# Patient Record
(demographics unavailable — no encounter records)

---

## 2019-11-06 NOTE — RAD
EXAM: Chest, 2 views.

 

HISTORY: Pneumonia.

 

COMPARISON: 10/30/2019

 

FINDINGS: 2 views of the chest are obtained. There has been slight 

interval increase in posterior left upper lobe pneumonia. There is no 

pleural effusion or pneumothorax. The heart is normal in size.

 

IMPRESSION: Suspected slight interval increase in left lower lobe 

pneumonia.  Continued follow-up is recommended to confirm resolution.

 

Electronically signed by: Shae Carlson MD (11/6/2019 4:51 PM) [place] [id number]

## 2019-11-15 NOTE — RAD
3 2 views of the abdomen 11/15/2019

 

INDICATION: Abdominal pain

 

COMPARISON STUDY: None

 

FINDINGS: The bowel gas pattern is nonspecific and nonobstructive. No 

gross pneumoperitoneum is identified, though exam is limited for the 

purpose. Surgical clips in the right upper quadrant suggest prior 

cholecystectomy. Surgical clip in the left abdomen of uncertain 

significance noted. Postsurgical changes in the noted in the pelvis. No 

acute osseous changes are noted.

 

IMPRESSION: No radiographic evidence of acute intra-abdominal abnormality

 

Electronically signed by: Hugo Sotelo MD (11/15/2019 2:26 PM) [id number]

## 2020-01-01 NOTE — RAD
PQRS Compliance Statement:

 

One or more of the following individualized dose reduction techniques were

utilized for this examination:  

1. Automated exposure control  

2. Adjustment of the mA and/or kV according to patient size  

3. Use of iterative reconstruction technique

 

CT abdomen/pelvis with contrast 1/1/2020 3:00 PM

 

INDICATION: Abdominal pain

 

COMPARISON: None available

 

TECHNIQUE: Multiple axial CT images of the abdomen and pelvis were 

obtained after the intravenous administration of 75 mL nonionic contrast. 

Coronal and sagittal reformats are provided.

 

FINDINGS:

 

There is subsegmental atelectasis at the right lung base. Heart size is 

within normal limits.

 

Liver, spleen, bilateral adrenal glands and pancreas are normal in 

appearance. Gallbladder surgically absent. No significant intrahepatic or 

extrahepatic biliary ductal dilatation. The abdominal aorta is normal in 

course and caliber. There are no pathologically enlarged lymph nodes in 

the abdomen and pelvis. There is no abdominal free fluid. There is no free

intraperitoneal air.

 

Kidneys enhance symmetrically. There is a 3 mm calculus in inferior pole 

the right kidney. No hydronephrosis. There is a 10 mm cyst in the 

interpolar left kidney. Urinary bladder is within normal limits given 

degree of distention. There is a cystic lesion which arises off the 

vaginal cuff and measures 2.3 x 2.4 cm.

 

Small large bowel are normal in caliber. No evidence for bowel obstruction

or inflammation. Appendix is not definitively visualized. No pericecal 

inflammatory changes are present. No suspicious osseous normality is 

identified.

 

IMPRESSION:

1. No acute abnormality is identified in abdomen and pelvis.

2. 3 mm nonobstructing calculus is identified in inferior pole the right 

kidney.

3. Cystic lesion along the vaginal cuff measures 2.3 x 2.4 cm. This 

appears more simple compared to prior examination from 3/30/2012 where 

similar finding is noted measuring approximately 1.7 x 1.8 cm. Further 

evaluation with pelvic ultrasound may be of benefit for further 

characterization. Correlate with surgical history.

 

Electronically signed by: Belkis Hendrix MD (1/1/2020 3:55 PM) 

[facility]

## 2020-01-01 NOTE — PHYS DOC
Past History


Past Medical History:  No Pertinent History


Past Surgical History:  Other


Additional Past Surgical Histo:  foot surgery


Smoking:  Non-smoker


Alcohol Use:  Rarely


Drug Use:  None





Adult General


Chief Complaint


Chief Complaint:  ABDOMINAL PAIN





HPI


HPI





Patient is a 50-year-old female who presents with complaint of mid abdominal 

pain that has been going on for the last 2 months but has gotten a lot worse 

over the last 2 days. Patient rates her pain currently at a 10 out of 10. She 

does indicate that she's had some nausea but no vomiting. She denies any 

diarrhea. Patient states that her last normal bowel movement was yesterday. She 

also denies any chest pain or shortness breath. She has had no fever. She denies

any urinary discomfort. Patient states that nothing is improving her pain.[]





Review of Systems


Review of Systems





Constitutional: Denies fever or chills []


Eyes: Denies change in visual acuity, redness, or eye pain []


HENT: Denies nasal congestion or sore throat []


Respiratory: Denies cough or shortness of breath []


Cardiovascular: No additional information not addressed in HPI []


GI: Denies abdominal pain, nausea, vomiting, bloody stools or diarrhea []


: Denies dysuria or hematuria []


Musculoskeletal: Denies back pain or joint pain []


Integument: Denies rash or skin lesions []


Neurologic: Denies headache, focal weakness or sensory changes []


Endocrine: Denies polyuria or polydipsia []





All other systems were reviewed and found to be within normal limits, except as 

documented in this note.





Current Medications


Current Medications





Current Medications








 Medications


  (Trade)  Dose


 Ordered  Sig/[facility]  Start Time


 Stop Time Status Last Admin


Dose Admin


 


 Hydromorphone HCl


  (Dilaudid)  0.5 mg  PRN Q15MIN  PRN  1/1/20 15:00


 1/2/20 14:59  1/1/20 15:18


0.5 MG


 


 Iohexol


  (Omnipaque 300


 Mg/ml)  75 ml  1X  ONCE  1/1/20 15:15


 1/1/20 15:16 DC 1/1/20 15:23


75 ML


 


 Ondansetron HCl


  (Zofran)  4 mg  1X  ONCE  1/1/20 15:15


 1/1/20 15:16 DC 1/1/20 15:18


4 MG


 


 Sodium Chloride  1,000 ml @ 


 1,000 mls/hr  Q1H  1/1/20 15:00


 1/1/20 15:59 DC 1/1/20 15:00


1,000 MLS/HR











Allergies


Allergies





Allergies








Coded Allergies Type Severity Reaction Last Updated Verified


 


  varenicline tartrate Allergy Severe SEIZURES 7/27/14 Yes


 


  titanium Allergy Intermediate rash 7/27/14 Yes


 


  tizanidine HCl Allergy Intermediate tremors 12/3/13 Yes


 


  atropine Adverse Reaction Severe hypertension   skin turned very red 12/3/13 

Yes











Physical Exam


Physical Exam





Constitutional: Well developed, well nourished, no acute distress, non-toxic 

appearance. []


HENT: Normocephalic, atraumatic, bilateral external ears normal, oropharynx m

oist, no oral exudates, nose normal. []


Eyes: PERRLA, EOMI, conjunctiva normal, no discharge. [] 


Neck: Normal range of motion, no tenderness, supple, no stridor. [] 


Cardiovascular:Heart rate regular rhythm, no murmur []


Lungs & Thorax:  Bilateral breath sounds clear to auscultation []


Abdomen: Bowel sounds normal, soft, no tenderness, no masses, no pulsatile 

masses. [] 


Skin: Warm, dry, no erythema, no rash. [] 


Back: No tenderness, no CVA tenderness. [] 


Extremities: No tenderness, no cyanosis, no clubbing, ROM intact, no edema. [] 


Neurologic: Alert and oriented X 3, normal motor function, normal sensory 

function, no focal deficits noted. []


Psychologic: Affect normal, judgement normal, mood normal. []





Current Patient Data


Vital Signs





                                   Vital Signs








  Date Time  Temp Pulse Resp B/P (MAP) Pulse Ox O2 Delivery O2 Flow Rate FiO2


 


1/1/20 15:23 97.9 81 16  98 Room Air  








Lab Results





                                Laboratory Tests








Test


 1/1/20


15:10


 


White Blood Count


 11.0 x10^3/uL


(4.0-11.0)


 


Red Blood Count


 5.07 x10^6/uL


(3.50-5.40)


 


Hemoglobin


 14.8 g/dL


(12.0-15.5)


 


Hematocrit


 44.0 %


(36.0-47.0)


 


Mean Corpuscular Volume


 87 fL ()





 


Mean Corpuscular Hemoglobin 29 pg (25-35)  


 


Mean Corpuscular Hemoglobin


Concent 34 g/dL


(31-37)


 


Red Cell Distribution Width


 14.2 %


(11.5-14.5)


 


Platelet Count


 216 x10^3/uL


(140-400)


 


Neutrophils (%) (Auto) 75 % (31-73)  H


 


Lymphocytes (%) (Auto) 19 % (24-48)  L


 


Monocytes (%) (Auto) 5 % (0-9)  


 


Eosinophils (%) (Auto) 1 % (0-3)  


 


Basophils (%) (Auto) 0 % (0-3)  


 


Neutrophils # (Auto)


 8.3 x10^3uL


(1.8-7.7)  H


 


Lymphocytes # (Auto)


 2.1 x10^3/uL


(1.0-4.8)


 


Monocytes # (Auto)


 0.5 x10^3/uL


(0.0-1.1)


 


Eosinophils # (Auto)


 0.1 x10^3/uL


(0.0-0.7)


 


Basophils # (Auto)


 0.0 x10^3/uL


(0.0-0.2)


 


Urine Collection Type Unknown  


 


Urine Color Yellow  


 


Urine Clarity Clear  


 


Urine pH 6.0  


 


Urine Specific Gravity >=1.030  


 


Urine Protein


 Neg


(NEG-TRACE)


 


Urine Glucose (UA)


 Neg mg/dL


(NEG)


 


Urine Ketones (Stick)


 Neg mg/dL


(NEG)


 


Urine Blood Mod (NEG)  


 


Urine Nitrite Neg (NEG)  


 


Urine Bilirubin Neg (NEG)  


 


Urine Urobilinogen Dipstick


 0.2 mg/dL (0.2


mg/dL)


 


Urine Leukocyte Esterase Neg (NEG)  


 


Urine RBC


 3-5 /HPF (0-2)





 


Urine WBC


 1-4 /HPF (0-4)





 


Urine Squamous Epithelial


Cells Occ /LPF  





 


Urine Bacteria


 0 /HPF (0-FEW)





 


Urine Mucus Mod /LPF  


 


Sodium Level


 140 mmol/L


(136-145)


 


Potassium Level


 3.9 mmol/L


(3.5-5.1)


 


Chloride Level


 103 mmol/L


()


 


Carbon Dioxide Level


 25 mmol/L


(21-32)


 


Anion Gap 12 (6-14)  


 


Blood Urea Nitrogen


 20 mg/dL


(7-20)


 


Creatinine


 0.8 mg/dL


(0.6-1.0)


 


Estimated GFR


(Cockcroft-Gault) 75.9  





 


BUN/Creatinine Ratio 25 (6-20)  H


 


Glucose Level


 93 mg/dL


(70-99)


 


Calcium Level


 9.2 mg/dL


(8.5-10.1)


 


Total Bilirubin


 0.1 mg/dL


(0.2-1.0)  L


 


Aspartate Amino Transferase


(AST) 11 U/L (15-37)


L


 


Alanine Aminotransferase (ALT)


 18 U/L (14-59)





 


Alkaline Phosphatase


 72 U/L


()


 


Total Protein


 8.1 g/dL


(6.4-8.2)


 


Albumin


 3.9 g/dL


(3.4-5.0)


 


Albumin/Globulin Ratio


 0.9 (1.0-1.7)


L


 


Lipase


 136 U/L


()











EKG


EKG


[]





Radiology/Procedures


Radiology/Procedures


[]


Impressions:


PROCEDURE: CT ABD PELV W/ IV CONTRST ONLY





PQRS Compliance Statement:


 


One or more of the following individualized dose reduction techniques were


utilized for this examination:  


1. Automated exposure control  


2. Adjustment of the mA and/or kV according to patient size  


3. Use of iterative reconstruction technique


 


CT abdomen/pelvis with contrast 1/1/2020 3:00 PM


 


INDICATION: Abdominal pain


 


COMPARISON: None available


 


TECHNIQUE: Multiple axial CT images of the abdomen and pelvis were 


obtained after the intravenous administration of 75 mL nonionic contrast. 


Coronal and sagittal reformats are provided.


 


FINDINGS:


 


There is subsegmental atelectasis at the right lung base. Heart size is 


within normal limits.


 


Liver, spleen, bilateral adrenal glands and pancreas are normal in 


appearance. Gallbladder surgically absent. No significant intrahepatic or 


extrahepatic biliary ductal dilatation. The abdominal aorta is normal in 


course and caliber. There are no pathologically enlarged lymph nodes in 


the abdomen and pelvis. There is no abdominal free fluid. There is no free


intraperitoneal air.


 


Kidneys enhance symmetrically. There is a 3 mm calculus in inferior pole 


the right kidney. No hydronephrosis. There is a 10 mm cyst in the 


interpolar left kidney. Urinary bladder is within normal limits given 


degree of distention. There is a cystic lesion which arises off the 


vaginal cuff and measures 2.3 x 2.4 cm.


 


Small large bowel are normal in caliber. No evidence for bowel obstruction


or inflammation. Appendix is not definitively visualized. No pericecal 


inflammatory changes are present. No suspicious osseous normality is 


identified.


 


IMPRESSION:


1. No acute abnormality is identified in abdomen and pelvis.


2. 3 mm nonobstructing calculus is identified in inferior pole the right 


kidney.


3. Cystic lesion along the vaginal cuff measures 2.3 x 2.4 cm. This 


appears more simple compared to prior examination from 3/30/2012 where 


similar finding is noted measuring approximately 1.7 x 1.8 cm. Further 


evaluation with pelvic ultrasound may be of benefit for further 


characterization. Correlate with surgical history.


 


Electronically signed by: Jeb Fair MD (1/1/2020 3:55 PM) 


[facility]














DICTATED AND SIGNED BY:     JEB FAIR MD


DATE:     01/01/20 1555





Course & Med Decision Making


Course & Med Decision Making


Pertinent Labs and Imaging studies reviewed. (See chart for details)





[]





Dragon Disclaimer


Dragon Disclaimer


This electronic medical record was generated, in whole or in part, using a voice

 recognition dictation system.





Departure


Departure:


Impression:  


   Primary Impression:  


   Intractable abdominal pain


Disposition:  02 XFER T-[facility]


Admitting Physician:  Damon Silverman


Condition:  IMPROVED


Referrals:  


RASHMI JUÁREZ (PCP)











SANDY GAVIRIA Jr. DO           Jan 1, 2020 16:40

## 2020-01-09 NOTE — PHYS DOC
Past History


Past Medical History:  No Pertinent History


Past Surgical History:  Other


Additional Past Surgical Histo:  foot surgery


Smoking:  Non-smoker


Alcohol Use:  Rarely


Drug Use:  None





Adult General


Chief Complaint


Chief Complaint:  NEURO SYMPTOMS/DEFICITS





[facility]


HPI





Patient is a 50-year-old female who was admitted here recently due to 

intractable abdominal pain, she was transferred to [facility] for

abdominal pain, she was admitted there for 5 days.  Workup did not find any 

acute problem, she was discharged to home this morning. Patient said prior to 

her discharge she went to the bathroom when she was at the hospital, when she 

came back from the bathroom she had more abdominal pain and she passed out on 

her onto the bed.  Patient was instructed to follow-up with male clinic for her 

abdominal pain problem.





Patient was brought home by her , she was left at home by herself when 

her  went to [facility] pharmacy to drop off the prescription medication at 

1:30 pm. He came home and found her on the bathroom floor actively vomiting and 

became unresponsive. Patient's  called EMS who brought her here for 

evaluation.  Medics reported that on route here patient was awake and alert, she

verbally told EMS to take her to [facility] for evaluation.  UPON 

Arrival to ER, patient was awake and alert but she had a convulsion activity,  

her feet was dorsiflexed and her wrist were in flexed position.  She had a c-

collar in place, she was able to answer questions.  .She appeared to have 

pseudoseizure activity.  Family stated that patient has pseudoseizure activity 

in the past due to emotional distress or due to pain. She has this abdominal 

pain for a long time, she had been evaluated by multiple doctors but did not 

find out why she had the pain.  She denies suicidal ideation.





Patient complaint of numbness and tingling sensation  all over her body.





All other ROS is negative unless otherwise noted in HPI





Review of Systems


Review of Systems


See above





Current Medications


Current Medications





Current Medications








 Medications


  (Trade)  Dose


 Ordered  Sig/Jad  Start Time


 Stop Time Status Last Admin


Dose Admin


 


 Fentanyl Citrate


  (Fentanyl 2ml


 Vial)  75 mcg  1X  ONCE  20 17:15


 20 17:16   





 


 Potassium Chloride


  (Klor-Con)  40 meq  1X  ONCE  20 16:45


 20 16:46 DC 20 16:51


40 MEQ











Allergies


Allergies





Allergies








Coded Allergies Type Severity Reaction Last Updated Verified


 


  varenicline tartrate Allergy Severe SEIZURES 20 Yes


 


  titanium Allergy Intermediate rash 20 Yes


 


  tizanidine HCl Allergy Intermediate tremors 20 Yes


 


  atropine Adverse Reaction Severe hypertension   skin turned very red 20 Y

es











Physical Exam


Physical Exam


See above


Constitutional: Well developed, well nourished, in mild distresss because she 

was having convulsion with her wrists and her legs while she was awake and aswe

ring questions, non-toxic appearance. []


HENT: Normocephalic, atraumatic, bilateral external ears normal, oropharynx 

moist, no oral exudates, nose normal. []


Eyes: PERRLA, EOMI, conjunctiva normal, no discharge. [] 


Neck: Normal range of motion, no tenderness, supple, no stridor. [] 


Cardiovascular:Heart rate regular rhythm, no murmur []


Lungs & Thorax:  Bilateral breath sounds clear to auscultation []


Abdomen: Bowel sounds normal, soft, no tenderness, no masses, no pulsatile 

masses. [] 


Skin: Warm, dry, no erythema, no rash. [] 


Back: No tenderness, no CVA tenderness. [] 


Extremities: No tenderness, no cyanosis, no clubbing, ROM intact, no edema. [] 


Neurologic: Alert and oriented X 3,  patient was observing moving all of her 

extremities, her speech was clear but slow. 


Psychologic: Affect normal, judgement normal, mood normal. []





Current Patient Data


Vital Signs





                                   Vital Signs








  Date Time  Temp Pulse Resp B/P (MAP) Pulse Ox O2 Delivery O2 Flow Rate FiO2


 


20 16:53  79 12 132/77 (95) 96 Room Air  








Lab Results





                                Laboratory Tests








Test


 20


14:58 20


15:20 20


16:00


 


Glucose (Fingerstick)


 95 mg/dL


(70-99) 


 





 


White Blood Count


 


 11.8 x10^3/uL


(4.0-11.0)  H 





 


Red Blood Count


 


 4.68 x10^6/uL


(3.50-5.40) 





 


Hemoglobin


 


 13.6 g/dL


(12.0-15.5) 





 


Hematocrit


 


 41.0 %


(36.0-47.0) 





 


Mean Corpuscular Volume


 


 88 fL ()


 





 


Mean Corpuscular Hemoglobin  29 pg (25-35)   


 


Mean Corpuscular Hemoglobin


Concent 


 33 g/dL


(31-37) 





 


Red Cell Distribution Width


 


 14.0 %


(11.5-14.5) 





 


Platelet Count


 


 204 x10^3/uL


(140-400) 





 


Neutrophils (%) (Auto)  79 % (31-73)  H 


 


Lymphocytes (%) (Auto)  16 % (24-48)  L 


 


Monocytes (%) (Auto)  4 % (0-9)   


 


Eosinophils (%) (Auto)  1 % (0-3)   


 


Basophils (%) (Auto)  1 % (0-3)   


 


Neutrophils # (Auto)


 


 9.2 x10^3uL


(1.8-7.7)  H 





 


Lymphocytes # (Auto)


 


 1.8 x10^3/uL


(1.0-4.8) 





 


Monocytes # (Auto)


 


 0.5 x10^3/uL


(0.0-1.1) 





 


Eosinophils # (Auto)


 


 0.1 x10^3/uL


(0.0-0.7) 





 


Basophils # (Auto)


 


 0.1 x10^3/uL


(0.0-0.2) 





 


Sodium Level


 


 137 mmol/L


(136-145) 





 


Potassium Level


 


 3.3 mmol/L


(3.5-5.1)  L 





 


Chloride Level


 


 103 mmol/L


() 





 


Carbon Dioxide Level


 


 23 mmol/L


(21-32) 





 


Anion Gap  11 (6-14)   


 


Blood Urea Nitrogen


 


 9 mg/dL (7-20)


 





 


Creatinine


 


 0.8 mg/dL


(0.6-1.0) 





 


Estimated GFR


(Cockcroft-Gault) 


 75.9  


 





 


BUN/Creatinine Ratio  11 (6-20)   


 


Glucose Level


 


 110 mg/dL


(70-99)  H 





 


Calcium Level


 


 8.9 mg/dL


(8.5-10.1) 





 


Total Bilirubin


 


 0.3 mg/dL


(0.2-1.0) 





 


Aspartate Amino Transferase


(AST) 


 14 U/L (15-37)


L 





 


Alanine Aminotransferase (ALT)


 


 27 U/L (14-59)


 





 


Alkaline Phosphatase


 


 72 U/L


() 





 


Total Protein


 


 7.5 g/dL


(6.4-8.2) 





 


Albumin


 


 3.6 g/dL


(3.4-5.0) 





 


Albumin/Globulin Ratio


 


 0.9 (1.0-1.7)


L 





 


Urine Opiates Screen   Neg (NEG)  


 


Urine Methadone Screen   Neg (NEG)  


 


Urine Barbiturates   Neg (NEG)  


 


Urine Phencyclidine Screen   Neg (NEG)  


 


Urine


Amphetamine/Methamphetamine 


 


 Neg (NEG)  





 


Urine Benzodiazepines Screen   Neg (NEG)  


 


Urine Cocaine Screen   Neg (NEG)  


 


Urine Cannabinoids Screen   Neg (NEG)  


 


Urine Ethyl Alcohol   Neg (NEG)  











EKG


EKG


EKG was done and read by this physician at 0257, rate of 76 bpm, NSR, NO STEMI.





Radiology/Procedures


Radiology/Procedures


[][facility] [address], [place] [address] [phone]


                                        


                                 IMAGING REPORT





                                     Signed





PATIENT: MARIA A RANGEL    ACCOUNT: [id number]     MRN#: [id number]


: 1969           LOCATION: ER              AGE: 50


SEX: F                    EXAM DT: 20         ACCESSION#: [id number]


STATUS: REG ER            ORD. PHYSICIAN: KONRAD JOHNSON DO


REASON: LEFT SIDE WEAKNESS


PROCEDURE: CT CODE STROKE HEAD WO





Examination: CT CODE STROKE HEAD WO


 


History: Left-sided weakness


 


Comparison/Correlation: 2017 CT head cervical spine without contrast


 


Findings: Axial images of head were obtained without contrast. Ventricles 


are normal size. No intracranial hemorrhage, midline shift, or mass 


effect. Subtle focal low-attenuation the left insula is similar to prior 


exam. Bony structures are unremarkable. Partial opacification of paranasal


sinuses.


 


 


Impression:


No suspicious acute process.


 


On 2020 2:58 PM, results were reported to referring emergency room 


physician.


 


 


PQRS Compliance Statement:


 


One or more of the following individualized dose reduction techniques were


utilized for this examination:  


1. Automated exposure control  


2. Adjustment of the mA and/or kV according to patient size  


3. Use of iterative reconstruction technique


 


Electronically signed by: Vargas Geller MD (2020 3:01 PM) [facility]














DICTATED AND SIGNED BY:     VARGAS GELLER MD


DATE:     20 1501





CC: RASHMI JUÁREZ; KONRAD JOHNSON DO ~








                               [facility] [address], [place] [address] [phone]


                                        


                                 IMAGING REPORT





                                     Signed





PATIENT: MARIA A RANGEL    ACCOUNT: [id number]     MRN#: [id number]


: 1969           LOCATION: ER              AGE: 50


SEX: F                    EXAM DT: 20         ACCESSION#: [id number]


STATUS: REG ER            ORD. PHYSICIAN: KONRAD JOHNSON DO


REASON: LEFT SIDE WEAKNESS


PROCEDURE: CT CERVICAL SPINE WO CONTRAST





Study: CT cervical spine without contrast


 


INDICATION: Left-sided weakness.


 


COMPARISON: No prior CT is available for review


 


TECHNIQUE: Axial CT imaging of the cervical spine performed without 


intravenous contrast. Sagittal and coronal reformats were obtained.


 


One or more of the following individualized dose reduction techniques were


utilized for this examination:  


 


1. Automated exposure control


2. Adjustment of the mA and/or kV according to patient size


3. Use of iterative reconstruction technique.


 


FINDINGS:


 


Mild broad dextrocurvature of the cervical spine. Straightening of 


cervical lordosis. No acute fracture. Sclerotic focus within the T1 


vertebral body appears to been faintly present on cervical spine 


radiographs performed in 2017 and is favored benign. No similar sclerotic 


focus seen elsewhere throughout the imaged body.


 


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 


space narrowing. Scattered levels of facet degeneration as well as 


uncovertebral joint hypertrophy. No severe bony encroachment on the 


central canal or neural foramina.


 


IMPRESSION:


1.  No acute fracture or aggressive osseous process seen throughout the 


cervical spine.


2.  Scattered degenerative changes without severe bony encroachment on the


central canal or neural foramina.


 


Electronically signed by: ALISON PHILLIPS MD (2020 3:05 PM) [facility]














DICTATED AND SIGNED BY:     ALISON PHILLIPS MD


DATE:     20 1505





CC: RASHMI JUÁREZ; KONRAD JOHNSON DO ~





Course & Med Decision Making


Course & Med Decision Making


Pertinent Labs and Imaging studies reviewed. (See chart for details)





Patient is a 50-year-old female who was evaluated in the ER what appear to be 

pseudoseizure, vasovagal syncopal episode due to abdominal pain.  She asked for 

pain medication, she was given 75 mcg of fentanyll IV. Patient was awake alert, 

talking with her family without a problem. She moves all extremities without a 

problem.  She was just discharged from [facility] this morning, 

there is no need for her to be admitted to hospital. Patient was instructed to 

follow with her family doctor for outpatient evaluation with GI specialist about

 her abdominal pain, neurologist for her pseudoseizure.  She already have 

prescription medication at [facility] pharmacy that was given to her this morning 

by the doctor at [facility], she will continue to take this 

medication as instructed.





Dragon Disclaimer


Dragon Disclaimer


This electronic medical record was generated, in whole or in part, using a voice

 recognition dictation system.





Departure


Departure:


Impression:  


   Primary Impression:  


   Pseudoseizure


   Additional Impressions:  


   Abdominal pain


   Syncope


Disposition:   HOME, SELF-CARE


Condition:  STABLE


Referrals:  


RASHMI JUÁREZ (PCP)


follow up with your doctor on monday


Patient Instructions:  Abdominal Pain, Syncope





Problem Qualifiers











KONRAD JOHNSON DO                 2020 17:16

## 2020-01-09 NOTE — EKG
[facility] [address], [place] [address]

Test Date:    2020               Test Time:    14:56:52

Pat Name:     MARIA A RANGEL              Department:   

Patient ID:   [id number]           Room:          

Gender:       F                        Technician:   

:          1969               Requested By: KONRAD JOHNSON

Order Number: [id number]            Reading MD:     

                                 Measurements

Intervals                              Axis          

Rate:         76                       P:            0

HI:           196                      QRS:          12

QRSD:         82                       T:            48

QT:           416                                    

QTc:          467                                    

                           Interpretive Statements

SINUS RHYTHM

NORMAL ECG

RI6.01

No previous ECG available for comparison

## 2020-01-09 NOTE — RAD
Examination: CT CODE STROKE HEAD WO

 

History: Left-sided weakness

 

Comparison/Correlation: 8/17/2017 CT head cervical spine without contrast

 

Findings: Axial images of head were obtained without contrast. Ventricles 

are normal size. No intracranial hemorrhage, midline shift, or mass 

effect. Subtle focal low-attenuation the left insula is similar to prior 

exam. Bony structures are unremarkable. Partial opacification of paranasal

sinuses.

 

 

Impression:

No suspicious acute process.

 

On 1/9/2020 2:58 PM, results were reported to referring emergency room 

physician.

 

 

PQRS Compliance Statement:

 

One or more of the following individualized dose reduction techniques were

utilized for this examination:  

1. Automated exposure control  

2. Adjustment of the mA and/or kV according to patient size  

3. Use of iterative reconstruction technique

 

Electronically signed by: Vargas Augustin MD (1/9/2020 3:01 PM) [facility]

## 2020-01-09 NOTE — RAD
Study: CT cervical spine without contrast

 

INDICATION: Left-sided weakness.

 

COMPARISON: No prior CT is available for review

 

TECHNIQUE: Axial CT imaging of the cervical spine performed without 

intravenous contrast. Sagittal and coronal reformats were obtained.

 

One or more of the following individualized dose reduction techniques were

utilized for this examination:  

 

1. Automated exposure control

2. Adjustment of the mA and/or kV according to patient size

3. Use of iterative reconstruction technique.

 

FINDINGS:

 

Mild broad dextrocurvature of the cervical spine. Straightening of 

cervical lordosis. No acute fracture. Sclerotic focus within the T1 

vertebral body appears to been faintly present on cervical spine 

radiographs performed in 2017 and is favored benign. No similar sclerotic 

focus seen elsewhere throughout the imaged body.

 

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 

space narrowing. Scattered levels of facet degeneration as well as 

uncovertebral joint hypertrophy. No severe bony encroachment on the 

central canal or neural foramina.

 

IMPRESSION:

1.  No acute fracture or aggressive osseous process seen throughout the 

cervical spine.

2.  Scattered degenerative changes without severe bony encroachment on the

central canal or neural foramina.

 

Electronically signed by: ALISON PHILLIPS MD (1/9/2020 3:05 PM) [facility]

## 2020-12-01 NOTE — RAD
Examination: Limited left breast ultrasound.

 

INDICATION: History of asymmetric density in the lateral left breast

 

COMPARISON: Bilateral mammogram of earlier the same day.

 

TECHNIQUE: Grayscale ultrasound imaging of the lateral left breast was 

performed, covering the upper outer quadrant. Sonographic survey of the 

left axilla and subareolar breast was also performed.

 

FINDINGS:

A 2 mm sonographically benign cyst at the left 3:00 position near the 

margin of the fibroglandular envelope is present. Otherwise negative left 

breast ultrasound.

 

IMPRESSION:

Benign findings on targeted left breast ultrasound. No evidence of 

malignancy.

 

BI-RADS Category 2

Benign findings

 

Recommend clinical management of any areas of clinical concern.

In the absence of any areas of clinical concern, routine screening 

mammography is recommended, next due in one year.

 

Electronically signed by: Nisa Leyva MD (12/1/2020 2:53 PM) 

[id number]

## 2020-12-01 NOTE — RAD
DATE: 12/1/2020 1:33 PM



EXAM: MAMMO JANICE ORLY PATEL



HISTORY:  Patient is a 51-year-old woman due for mammographic screening who by

report was found to have a left breast abnormality on recent chest CT.



COMPARISON: Bilateral mammogram of 8/6/2018 and 3/15/2016. The CT on which the

left breast abnormality was questioned is not currently available for

comparison.



Bilateral CC and MLO views of the breasts were performed. Bilateral breast

tomosynthesis was performed in CC and MLO projections.



This study was interpreted with the benefit of Computerized Aided Detection

(CAD).





FINDINGS:



Breast Density: FATTY  The Breast Parenchyma is primarily fatty replaced.

Breast parenchyma level density A.





No suspicious masses, microcalcifications or architectural distortion is

present to suggest malignancy in either breast.





The visualized axillae are unremarkable. 



IMPRESSION: No mammographic evidence of malignancy. 



BI-RADS CATEGORY: 1 NEGATIVE



RECOMMENDED FOLLOW-UP: 12M 12 MONTH FOLLOW-UP Annual screening mammography is

recommended, unless clinically indicated sooner based on symptoms or change in

physical exam. If the prior CT becomes available, an addendum to this report

can be issued.



PQRS compliance statement: Patient information was entered into a reminder

system with a target due date for the next mammogram.



Mammography is a sensitive method for finding small breast cancers, but it

does not detect them all and is not a substitute for careful clinical

examination.  A negative mammogram does not negate a clinically suspicious

finding and should not result in delay in biopsying a clinically suspicious

abnormality.



"Our facility is accredited by the American College [employer] Mammography

Program."

## 2021-03-21 NOTE — RAD
XR CHEST 2V 



INDICATION: Reason: CHEST PAIN / Spl. Instructions:  / History: . 



COMPARISON STUDY: None.



FINDINGS:



Lungs: Normal lung volume. No pulmonary mass or consolidation. The tracheobronchial tree and hilar st
ructures are normal.



Pleura: No pleural effusion or pneumothorax.



Heart and Mediastinum: The cardiomediastinal silhouette is normal. The great vessels of the thorax ar
e normal.



Bones and Soft Tissues: The bones and soft tissues are within normal limits.



IMPRESSION:  

No acute cardiopulmonary process.



Electronically signed by: Daniel Klein MD (3/21/2021 3:45 PM) [facility]

## 2021-03-21 NOTE — EKG
[facility] [address], [place] [address]

Test Date:    2021               Test Time:    14:39:49

Pat Name:     MARIA A RANGEL              Department:   

Patient ID:   [id number]           Room:          

Gender:       F                        Technician:   KESHA

:          1969               Requested By: LOREE QUINTANA

Order Number: [id number]            Reading MD:     

                                 Measurements

Intervals                              Axis          

Rate:         108                      P:            52

FL:           184                      QRS:          8

QRSD:         82                       T:            51

QT:           332                                    

QTc:          449                                    

                           Interpretive Statements

SINUS TACHYCARDIA

OTHERWISE NORMAL ECG

RI6.02

No previous ECG available for comparison

## 2021-03-21 NOTE — PHYS DOC
Past History


Past Medical History:  Anxiety, High Cholesterol, Hypertension, Kidney Stones, 

Migraines


 (LOREE QUINTANA)


Past Surgical History:  Appendectomy, Cholecystectomy, Hysterectomy, 

Tonsillectomy, Other


Additional Past Surgical Histo:  foot surgery


 (LOREE QUINTANA)


Smoking:  Non-smoker


Alcohol Use:  Rarely


Drug Use:  None


 (LOREE QUINTANA)





Adult General


Chief Complaint


Chief Complaint:  ANXIETY/PANIC ATTACK





HPI


HPI





Patient is a 51-year-old female presents emergency department complaining of 

chest pain and shaking and shortness of breath that started last night.  Patient

states she thought it was her gastric reflux and took Gaviscon without any 

relief.  Patient states that her brother is Covid positive and is in the ICU at 

[place], patient states she is very concerned about this.  Patient 

believes this might be why she is shaking so bad and having her problems.  

Patient denies any radiation of her pain.  Denies any diaphoresis, nausea, 

vomiting, or diarrhea.  Patient denies chest palpitations.  Patient denies any 

numbness or tingling to her extremities.  Patient denies any visual 

disturbances.  Patient does states she has a headache.  Patient states she 

usually gets migraines however this headache did not present like her normal 

migraines and it is the worst headache she has ever experienced in her life.  

Patient denies any swelling to her extremities.  Patient denies homicidal or 

suicidal ideations.  Patient denies any other physical complaints or physical 

concerns.


 (LOREE QUINTANA)





Review of Systems


Review of Systems


14 body systems of review of systems have been reviewed.  See HPI for pertinent 

positives and negative responses, otherwise all other systems are negative, 

nonpertinent or noncontributory.


 (LOREE QUINTANA)





Current Medications


Current Medications





Current Medications








 Medications


  (Trade)  Dose


 Ordered  Sig/Jad  Start Time


 Stop Time Status Last Admin


Dose Admin


 


 Aspirin


  (Aspirin


 Chewable)  324 mg  1X  ONCE  3/21/21 15:00


 3/21/21 15:16 DC  





 


 Lorazepam


  (Ativan Inj)  1 mg  1X  ONCE  3/21/21 15:00


 3/21/21 15:16 DC 3/21/21 15:00


1 MG








 (LOREE QUINTANA)





Allergies


Allergies





Allergies








Coded Allergies Type Severity Reaction Last Updated Verified


 


  varenicline tartrate Allergy Severe SEIZURES 20 Yes


 


  titanium Allergy Intermediate rash 20 Yes


 


  tizanidine HCl Allergy Intermediate tremors 20 Yes


 


  atropine Adverse Reaction Severe hypertension   skin turned very red 20 

Yes








 (LOREE QUINTANA)





Physical Exam


Physical Exam





Constitutional: Well developed, well nourished, acute anxiety distress, non-

toxic appearance.  51-year-old female, hyperventilating and shaking all 

extremities, rapidly blinking her eyes, looking caudally.  Patient states "look,

eyes are rolling to the back of my head "


HENT: Normocephalic, atraumatic, bilateral external ears normal, oropharynx 

moist, no oral exudates, nose normal.  Oropharynx moist, no infectious process 

appreciated, no trismus, no drooling, no lymphadenopathy of the head or neck 

appreciated.  No obvious signs of head injury, no depressions appreciated of the

skull, no contusions appreciated of the skull or scalp.


Eyes: PERRLA, EOMI, conjunctiva normal, no discharge.  Patient will track 

normally if asked to, normal satisfactory 6 cardinal eye movements.


Neck: Normal range of motion, no tenderness, supple, no stridor.  No neck pain, 

no meningismus signs, no nuchal rigidity.


Cardiovascular:Heart rate regular rhythm, heart rate tachycardic, heart sounds 

S1-S2 to auscultation.


Lungs & Thorax:  Bilateral breath sounds clear to auscultation all lung fields, 

tachypneic rate, no adventitious lung sounds appreciated.


Abdomen: Bowel sounds normal, soft, no tenderness, no masses, no pulsatile 

masses.  


Skin: Warm, dry, no erythema, no rash.  


Back: No tenderness to palpation along vertebral column bony prominences or 

adjacent muscular skeletal structures of the back.


Extremities: No tenderness, no cyanosis, no clubbing, ROM intact, no edema.  

+2/4 pulses, distal cap refill less than 2 seconds, patient shaking hands and 

tremor type motion, will stop when asked to for examination, then will resume 

tremor type motion.


Neurologic: Alert and oriented X 3, normal motor function, normal sensory 

function, no focal deficits noted. 


Psychologic: Affect normal, judgement normal, mood normal. 


 (LOREE QUINTANA)





Current Patient Data


Vital Signs





                                   Vital Signs








  Date Time  Temp Pulse Resp B/P (MAP) Pulse Ox O2 Delivery O2 Flow Rate FiO2


 


3/21/21 14:37  120 40 167/106 (126) 98 Room Air  








Lab Results





                                Laboratory Tests








Test


 3/21/21


14:45


 


White Blood Count


 9.5 x10^3/uL


(4.0-11.0)


 


Red Blood Count


 4.83 x10^6/uL


(3.50-5.40)


 


Hemoglobin


 13.9 g/dL


(12.0-15.5)


 


Hematocrit


 42.0 %


(36.0-47.0)


 


Mean Corpuscular Volume


 87 fL ()





 


Mean Corpuscular Hemoglobin 29 pg (25-35)  


 


Mean Corpuscular Hemoglobin


Concent 33 g/dL


(31-37)


 


Red Cell Distribution Width


 14.8 %


(11.5-14.5)  H


 


Platelet Count


 235 x10^3/uL


(140-400)


 


Neutrophils (%) (Auto) 69 % (31-73)  


 


Lymphocytes (%) (Auto) 24 % (24-48)  


 


Monocytes (%) (Auto) 4 % (0-9)  


 


Eosinophils (%) (Auto) 2 % (0-3)  


 


Basophils (%) (Auto) 1 % (0-3)  


 


Neutrophils # (Auto)


 6.6 x10^3uL


(1.8-7.7)


 


Lymphocytes # (Auto)


 2.3 x10^3/uL


(1.0-4.8)


 


Monocytes # (Auto)


 0.4 x10^3/uL


(0.0-1.1)


 


Eosinophils # (Auto)


 0.2 x10^3/uL


(0.0-0.7)


 


Basophils # (Auto)


 0.1 x10^3/uL


(0.0-0.2)


 


Prothrombin Time


 9.9 SEC


(9.4-11.4)


 


Prothrombin Time INR 1.0 (0.9-1.1)  


 


Activated Partial


Thromboplast Time 26 SEC (23-33)





 


D-Dimer (Aidee)


 0.53 mg/L


(0.00-0.50)  H


 


Sodium Level


 146 mmol/L


(136-145)  H


 


Potassium Level


 3.5 mmol/L


(3.5-5.1)


 


Chloride Level


 108 mmol/L


()  H


 


Carbon Dioxide Level


 24 mmol/L


(21-32)


 


Anion Gap 14 (6-14)  


 


Blood Urea Nitrogen


 12 mg/dL


(7-20)


 


Creatinine


 0.9 mg/dL


(0.6-1.0)


 


Estimated GFR


(Cockcroft-Gault) 66.0  





 


BUN/Creatinine Ratio 13 (6-20)  


 


Glucose Level


 154 mg/dL


(70-99)  H


 


Calcium Level


 9.7 mg/dL


(8.5-10.1)


 


Magnesium Level


 1.8 mg/dL


(1.8-2.4)


 


Total Bilirubin


 0.2 mg/dL


(0.2-1.0)


 


Direct Bilirubin


 0.1 mg/dL


(0.0-0.2)


 


Aspartate Amino Transferase


(AST) 20 U/L (15-37)





 


Alanine Aminotransferase (ALT)


 30 U/L (14-59)





 


Alkaline Phosphatase


 97 U/L


()


 


Creatine Kinase


 86 U/L


()


 


Creatine Kinase MB (Mass)


 0.8 ng/mL


(0.0-3.6)


 


Creatine Kinase MB Relative


Index 0.9 % (0-4)  





 


Troponin I Quantitative


 < 0.017 ng/mL


(0-0.055)


 


NT-Pro-B-Type Natriuretic


Peptide 30 pg/mL


(0-124)


 


Total Protein


 7.8 g/dL


(6.4-8.2)


 


Albumin


 4.2 g/dL


(3.4-5.0)


 


Albumin/Globulin Ratio 1.2 (1.0-1.7)  


 


Lipase


 161 U/L


()








 (LOREE QUINTANA)





EKG


EKG


EKG performed at 1439 by house respiratory therapy staff, shows a sinus 

tachycardia heart rate 108 bpm without other ectopy, NM interval 0.184, QTc 

interval 0.449, no acute STEMI, no ACS, no acute ischemia appreciated.  EKG 

interpreted by ED attending physician Dr. Vogel.


 (LOREE QUINTANA)





Radiology/Procedures


Radiology/Procedures


PATIENT: MARIA A RANGELCOUNT: [id number]#: [id number]


: 1969           LOCATION: ER              AGE: 51


SEX: F                    EXAM DT: 21         ACCESSION#: [id number]


STATUS: PRE ER            ORD. PHYSICIAN: LOREE QUINTANA


REASON: SEVERE HEADACHE, anxiety


PROCEDURE: CT HEAD WO CONTRAST





CT HEAD/BRAIN WO 





Date: 3/21/2021 3:15 PM 





Clinical Indication: Reason: SEVERE HEADACHE, anxiety / Spl. Instructions:  / 

History: 





Comparison: 2020.





Technique:  5 mm axial tomographic images were obtained of the head without 

contrast. These were viewed on brain and bone windows. One or more of the 

following dose reduction techniques were utilized: Automated exposure control 

(AEC), Adjustment of mA and/or kV according to patient size, Use of iterative 

reconstruction technique such as ASiR, CT scan done according to ALARA and image

gently/image wisely





Findings:





The brain parenchyma is normal in attenuation. No intra- or extra-axial mass or 

fluid collection. No acute hemorrhage. The ventricles are normal in size, shape,

and morphology. The gray-white matter junction is normal. The subarachnoid 

cisterns are patent. 





The visualized paranasal sinuses are normal.  The visualized portions of the 

orbits and globes are normal. The mastoid air cells are clear. 





The  topogram shows no lytic lesion or fracture. 





Impression:





No acute intracranial process.





Electronically signed by: Henry Klein MD (3/21/2021 3:46 PM) [facility]














DICTATED AND SIGNED BY:     HENRY KLEIN MD


DATE:     21





CC: LOREE QUINTANA; RASHMI JUÁREZ ~MTH0 0





PATIENT: MRAIA A RANGEL    ACCOUNT: [id number]     MRN#: [id number]


: 1969           LOCATION: ER              AGE: 51


SEX: F                    EXAM DT: 21         ACCESSION#: [id number]


STATUS: PRE ER            ORD. PHYSICIAN: LOREE QUINTANA


REASON: CHEST PAIN


PROCEDURE: CHEST PA & LATERAL





XR CHEST 2V 





INDICATION: Reason: CHEST PAIN / Spl. Instructions:  / History: . 





COMPARISON STUDY: None.





FINDINGS:





Lungs: Normal lung volume. No pulmonary mass or consolidation. The 

tracheobronchial tree and hilar structures are normal.





Pleura: No pleural effusion or pneumothorax.





Heart and Mediastinum: The cardiomediastinal silhouette is normal. The great 

vessels of the thorax are normal.





Bones and Soft Tissues: The bones and soft tissues are within normal limits.





IMPRESSION:  


No acute cardiopulmonary process.





Electronically signed by: Henry Klein MD (3/21/2021 3:45 PM) [facility]














DICTATED AND SIGNED BY:     HENRY KLEIN MD


DATE:     21





CC: LOREE QUINTANA; RASHMI JUÁREZ MIK FINNEGAN ~MTH0 0


 (LOREE QUINTANA)





Heart Score


C/O Chest Pain:  Yes


HEART Score for Chest Pain:  








HEART Score for Chest Pain Response (Comments) Value


 


History Slighlty/Non-Suspicious 0


 


ECG Normal 0


 


Age >45 - < 65 1


 


Risk Factors No Risk Factors 0


 


Troponin < Normal Limit 0


 


Total  1








Risk Factors:


Risk Factors:  DM, Current or recent (<one month) smoker, HTN, HLP, family 

history of CAD, obesity.


Risk Scores:


Risk Factors:  DM, Current or recent (<one month) smoker, HTN, HLP, family 

history of CAD, obesity.


 (LOREE QUINTANA)





Course & Med Decision Making


Course & Med Decision Making


Pertinent Labs and Imaging studies reviewed. (See chart for details)





51-year-old female, vital signs reviewed, presents emergency department 

concerning for chest pain, shakiness, eyes rolling to the back of her head, and 

headache.  Physical examination concerning for acute anxiety and her panic 

attack.  However with patient's HPI and physical complaints will initiate a 

cardiorespiratory work-up and neurological work-up related to her headache.  

Patient was given 324 mg baby aspirin, 1 mg Ativan IV.





Upon reevaluation of the patient, patient states she is starting to feel better,

patient is no longer tachycardic, will give an additional 1 mg dose of Ativan, a

headache cocktail consisting of 1 L normal saline, IV Compazine, Benadryl, and 

Toradol.  ED plan will reevaluate after period of time.





Patient CT head negative for acute process, checks x-ray negative for acute 

cardiopulmonary process.  Labs unremarkable, however D-dimer slightly elevated 

at 0.53, discussed this with ED attending Dr. Vogel who did not recommend any 

further follow-up related to this value after being told patient presentation 

and reviewing x-rays, CT, vital signs, patient is not hypoxic, patient is not 

short of breath.,  And the patient is no longer having chest pains.  EKG 

unremarkable.  Reevaluation of the patient found patient pain-free, patient 

states she is ready to go home.  Discussed with patient negative CT and chest x-

ray and lab findings. discussed with patient her presentation and symptoms most 

likely acute anxiety and/or panic attack.  Patient did respond well to IV Ativan

and headache cocktail.  Advised patient to follow-up with her primary care 

provider Jamal Juárez tomorrow for ongoing evaluation and treatment of 

anxiety/panic attacks.  Patient states she will call him tomorrow for 

appointment.





Patient gave verbal understanding of discharge home instructions, follow-up with

primary care physician tomorrow, return to ER precautions or concerns, was 

discharged home without incident.  Diagnosis of chest pain of unknown etiology, 

headache, anxiety and panic attack.





 (LOREE QUINTANA)


Course & Med Decision Making


I oversaw care of patient while in ER and discussed case at length with NP. 

Patient suffering form anxiety attack, has had increased life stressors. Less 

likely cardiac, resp or other cause. D-dimer should not have been ordered due to

low LR. Patient responded to ER intervention and safe for discharge with close 

PCP follow-up.





Electronically signed, Praveen Vogel DO


 (PRAVEEN VOGEL DO)


Helen Disclaimer


Dragon Disclaimer


This electronic medical record was generated, in whole or in part, using a voice

recognition dictation system.


 (LOREE QUINTANA)





Departure


Departure:


Impression:  


   Primary Impression:  


   Anxiety


   Additional Impressions:  


   Panic attack


   Headache


   Chest pain of unknown etiology


Disposition:  01 DC HOME SELF CARE/HOMELESS


Condition:  IMPROVED


Referrals:  


RASHMI JUÁREZ (PCP)





Additional Instructions:  


You are seen in the emergency department today, we have done an extensive 

cardiorespiratory work-up along with a neurological work-up, there were no 

concerning findings in your labs or CT scans or x-rays that would suggest 

needing admission to the hospital or immediate follow-up with a specialist.  As 

we discussed, your current life stressors may require ongoing evaluation of 

treatment at home.  This would be best done by your primary care provider Josiah Juárez.  We discussed you calling him tomorrow morning for an appointment for 

reevaluation of your symptoms.  Please return to the emergency department for 

worsening symptoms or other concerns.





EMERGENCY DEPARTMENT GENERAL DISCHARGE INSTRUCTIONS





Thank you for coming to [facility] (ED) today and trusting us

with you 


care.  We trust that you had a positivie experience in our Emergency Department.

 If you 


wish to speak to the department management, you may call the director at 

[phone].





YOUR FOLLOW UP INSTRUCTIONS ARE AS FOLLOWS:





1.  Do you have a private Doctor?  If you do not have a private doctor, please 

ask for a 


resource list of physicians or clinics that may be able to assist you with 

follow up care.





2.  The Emergency Physician has interpreted your x-rays.  The X-Ray specialist 

will also 


review them.  If there is a change in the findings, you will be notified in 48 

hours when at 


all possible.





3.  A lab test or culture has been done, your results will be reviewed and you 

will be 


notified if you need a change in treatment.





ADDITIONAL INSTRUCTIONS AND INFORMATION:





1.  Your care today has been supervised by a physician who is specially trained 

in emergency 


care.  Many problems require more than one evaluation for a complete diagnosis 

and 


treatment.  We recommend that you schedule your follow up appointment as 

recommended to 


ensure complete treatment of you illness or injury.  If you are unable to obtain

follow up 


care and continue to have a problem, or if your condition worsens, we recommend 

that you 


return to the ED.





2.  We are not able to safely determine your condition over the phone nor are we

able to 


give sound medical advice over the phone.  For these safety reasons, if you call

for medical 


advice we will ask you to come to the ED for further evaluation.





3.  If you have any questions regarding these discharge instructions please call

the ED at 


[phone].





SAFETY INFORMATION:





In the interest of safety, wellness, and injury prevention; we encourage you to 

wear your 


sealbelt, if you smoke; quite smoking, and we encourage family to use a 

protective helmet 


for bicycling and other sporting events that present an increased risk for head 

injury.





IF YOUR SYMPTOMS WORSEN OR NEW SYMPTOMS DEVELOP, OR YOU HAVE CONCERNS ABOUT YOUR

CONDITION; 


OR IF YOUR CONDITION WORSENS WHILE YOU ARE WAITING FOR YOUR FOLLOW UP 

APPOINTMENT; EITHER 


CONTACT YOUR PRIMARY CARE DOCTOR, THE PHYSICIAN WHOSE NAME AND NUMBER YOU WERE 

GIVEN, OR 


RETURN TO THE ED IMMEDIATELY.





Problem Qualifiers








   Additional Impressions:  


   Headache


   Headache type:  unspecified  Headache chronicity pattern:  unspecified 

   pattern  Intractability:  not intractable  Qualified Codes:  R51.9 - 

   Headache, unspecified








LOREE QUINTANA       Mar 21, 2021 16:00


PRAVEEN VOGEL DO                 Mar 21, 2021 20:38

## 2021-03-21 NOTE — RAD
CT HEAD/BRAIN WO 



Date: 3/21/2021 3:15 PM 



Clinical Indication: Reason: SEVERE HEADACHE, anxiety / Spl. Instructions:  / History: 



Comparison: 1/9/2020.



Technique:  5 mm axial tomographic images were obtained of the head without contrast. These were view
ed on brain and bone windows. One or more of the following dose reduction techniques were utilized: A
utomated exposure control (AEC), Adjustment of mA and/or kV according to patient size, Use of iterati
ve reconstruction technique such as ASiR, CT scan done according to ALARA and image gently/image wise
ly



Findings:



The brain parenchyma is normal in attenuation. No intra- or extra-axial mass or fluid collection. No 
acute hemorrhage. The ventricles are normal in size, shape, and morphology. The gray-white matter faraz
ction is normal. The subarachnoid cisterns are patent. 



The visualized paranasal sinuses are normal.  The visualized portions of the orbits and globes are no
rmal. The mastoid air cells are clear. 



The  topogram shows no lytic lesion or fracture. 



Impression:



No acute intracranial process.



Electronically signed by: Daniel Klein MD (3/21/2021 3:46 PM) [facility]

## 2021-09-26 NOTE — RAD
EXAM:  XR KNEE 4 VIEWS WITH PATELLA_RT 9/26/2021 9:05 PM



CLINICAL INDICATION:  Fall



COMPARISON:  None



TECHNIQUE:  4 views of the right knee



FINDINGS:  There is a probable small lipohemarthrosis. Mild irregularity at the lateral patellar face
t on sunrise view, possible subtle osteochondral fracture. No other fracture. Joint spaces are mainta
ined. There is also minimal infrapatellar soft tissue swelling along the patellar tendon stripe.



IMPRESSION:  Irregularity at the lateral patellar facet could be a subtle osteochondral fracture. The
re is a suspected small lipohemarthrosis and mild infrapatellar soft tissue swelling. CT or MRI could
 be obtained to further evaluate.



Electronically signed by: Winsome Ramsey MD (9/26/2021 10:19 PM) [facility]

## 2021-09-26 NOTE — EKG
[facility] [address], [place] [address]

Test Date:    2021               Test Time:    18:04:05

Pat Name:     MARIA A RANGEL              Department:   

Patient ID:   [id number]           Room:          

Gender:       F                        Technician:   KESHA

:          1969               Requested By: MELI VANEGAS

Order Number: [id number]            Reading MD:     

                                 Measurements

Intervals                              Axis          

Rate:         89                       P:            41

SC:           194                      QRS:          9

QRSD:         80                       T:            48

QT:           372                                    

QTc:          459                                    

                           Interpretive Statements

SINUS RHYTHM

NORMAL ECG

RI6.02

No previous ECG available for comparison

## 2021-09-26 NOTE — PHYS DOC
Past History


Past Medical History:  Anxiety, High Cholesterol, Hypertension, Kidney Stones, 

Migraines


Past Surgical History:  Appendectomy, Cholecystectomy, Hysterectomy, 

Tonsillectomy, Other


Additional Past Surgical Histo:  foot surgery


Smoking:  Non-smoker


Alcohol Use:  None


Drug Use:  None





General Adult


EDM:


Chief Complaint:  NEURO SYMPTOMS/DEFICITS





HPI:


HPI:





Patient is a 51 year old female who presents with above hx and complaints 

generalized weakness and falls times x 2 today.  Patient had one fall at 1730 

hrs. and a repeat fall at 2330 hrs.  The pt. complaints of contusions right 

shoulder, right elbow, right knee and head contusion.  Pt. follows with [facility]

for care.  Patient has a history of hx. of chronic migraines, anxiety, panic 

attacks, depression, elevated cholesterol, hypertension, kidney stones, and 

deconditioning.  Patient has had a increase episodes of syncope and falls.  

Patient does have scheduled follow-ups with neurologist.  Patient does report 

some episodes of tachycardia prior to her falls and dizziness.  Patient has 

somewhat extensive medical history and surgical history.  Patient has had 

appendectomy, cholecystectomy, hysterectomy, tonsillectomy, foot surgeries, left

ankle reconstruction, lumbar sacral laminectomy, and laparoscopic evaluations.  

Patient does not smoke tobacco or use alcohol to excess.  [facility] 

and also [facility].  Patient is accompanied with her .





Patient reports her Covid vaccination is up-to-date with Modern a completed 

 at the [facility].  Patient denies any recent travel.  

Patient denies any severe ill contacts.  Patient denies any change in her 

baseline medications.





Review of Systems:


Review of Systems:


Constitutional:  Denies fever or chills 


Eyes:  Denies change in visual acuity 


HENT:  Denies nasal congestion or sore throat 


Respiratory:  Denies cough or shortness of breath 


Cardiovascular:  Denies chest pain or edema 


GI:  Denies abdominal pain, nausea, vomiting, bloody stools or diarrhea 


: Denies dysuria 


Musculoskeletal: Complains of contusion on right side of body with the most 

tender area of right knee.


Integument:  Denies rash 


Neurologic: Complains of migraine headache,.  Denies focal weakness or sensory 

changes .  Complains of episodes of "lightheadedness "or dizziness.


Endocrine:  Denies polyuria or polydipsia 


Lymphatic:  Denies swollen glands 


Psychiatric: History of depression or anxiety





Family History:


Family History:


Noncontributory to presentation





Current Medications:


Current Meds:





Current Medications








 Medications


  (Trade)  Dose


 Ordered  Sig/Jad  Start Time


 Stop Time Status Last Admin


Dose Admin


 


 Lactated Ringer's  1,000 ml @ 


 100 mls/hr  Q10H  21 18:00


 21 03:59   














Allergies:


Allergies:





Allergies








Coded Allergies Type Severity Reaction Last Updated Verified


 


  varenicline tartrate Allergy Severe SEIZURES 20 Yes


 


  titanium Allergy Intermediate rash 20 Yes


 


  tizanidine HCl Allergy Intermediate tremors 20 Yes


 


  atropine Adverse Reaction Severe hypertension   skin turned very red 20 

Yes











Physical Exam:


PE:





Constitutional: Moderate acute distress, non-toxic appearance. []


HENT: Normocephalic, contusion to posterior scalp,, bilateral external ears 

normal, oropharynx moist, no oral exudates, nose normal. []


Eyes: PERRLA, EOMI, conjunctiva normal, no discharge.  No appreciable field 

deficits


Neck: Normal range of motion, mild upper neck tenderness, supple, no stridor. []

 


Cardiovascular:Heart rate regular rhythm, no murmur [] monitor shows a sinus 

rhythm with no acute morphology


Lungs & Thorax:  Bilateral breath sounds equal apex with few basilar crackles on

auscultation []


Abdomen: Bowel sounds normal, soft, no tenderness, no masses, no pulsatile 

masses.  Obese.  Old surgery scars.


Skin: Warm, dry, no erythema, no rash. [] 


Back: No tenderness, no CVA tenderness.  Old laminectomy scar lumbar sacral 

area.


Extremities: Right shoulder, right elbow, right knee, right hip tenderness, no 

cyanosis, no clubbing, surgical scar left ankle,, trace ankle edema. [] No 

cording appreciated, patient is able do straight leg lift with her right leg.  

Flexion of knee does cause pain in the patella area.  Patient has obvious 

bruising in right knee.


Neurologic: Alert and oriented X 3, moves all extremities on request, does have 

distal sensory,, no focal deficits noted. []


Psychologic: Affect anxious, judgement normal, mood depressed.





Current Patient Data:


Labs:


Note labs are not crossing over= my review of significant lab shows no acute 

changes in CBC.  No acute changes electrolytes, and normal  trops.  Normal coag 

labs.


Vital Signs:





                                   Vital Signs








  Date Time  Temp Pulse Resp B/P (MAP) Pulse Ox O2 Delivery O2 Flow Rate FiO2


 


21 18:04 98.4 89 16 144/91 (108) 98 Room Air  











EKG:


EKG:


My interpretation EKG at 1804 hrs. shows a sinus rhythm 89 bpm.  No acute 

morphology.  Time of EKG is 1804. 


My interpretation repeat EKG 00 43 minutes shows sinus rhythm at 78 bpm.  Mild 

leftward axis.  But overall morphology unchanged from prior EKG on file.





Radiology/Procedures:


Radiology/Procedures:


[facility] [address], [place] [address] [phone]


                                        


                                 IMAGING REPORT





                                     Signed





PATIENT: MARIA A RANGEL    ACCOUNT: [id number]     MRN#: [id number]


: 1969           LOCATION: ER              AGE: 51


SEX: F                    EXAM DT: 21         ACCESSION#: [id number]


STATUS: REG ER            ORD. PHYSICIAN: MARIBELL MAJOR MD


REASON: fall


PROCEDURE: KNEE RIGHT 4V





EXAM:  XR KNEE 4 VIEWS WITH PATELLA_RT 2021 9:05 PM





CLINICAL INDICATION:  Fall





COMPARISON:  None





TECHNIQUE:  4 views of the right knee





FINDINGS:  There is a probable small lipohemarthrosis. Mild irregularity at the 

lateral patellar facet on sunrise view, possible subtle osteochondral fracture. 

No other fracture. Joint spaces are maintained. There is also minimal 

infrapatellar soft tissue swelling along the patellar tendon stripe.





IMPRESSION:  Irregularity at the lateral patellar facet could be a subtle 

osteochondral fracture. There is a suspected small lipohemarthrosis and mild 

infrapatellar soft tissue swelling. CT or MRI could be obtained to further 

evaluate.





Electronically signed by: Winsome Ramsey MD (2021 10:19 PM) [facility]














DICTATED AND SIGNED BY:     WINSOME RAMSEY MD


DATE:     21





CC: MARIBELL MAJOR MD; RASHMI JUÁREZ ~MTH0 0


[facility] [address], [place] [address] [phone]


                                        


                                 IMAGING REPORT





                                     Signed





PATIENT: TOMY GARCIA CACCOUNT: [id number]     MRN#: [id number]


: 1986           LOCATION: ER              AGE: 35


SEX: F                    EXAM DT: 21         ACCESSION#: [id number]


STATUS: REG ER            ORD. PHYSICIAN: MARIBELL MAJOR MD


REASON: hx. pain similar prior kidney stone on Rt flank and side


PROCEDURE: CT ABDOMEN PELVIS WO CONTRAST





Exam: CT abdomen/pelvis without intravenous contrast





Indication: Pain similar to prior kidney stone on right





Comparison: CT abdomen pelvis 2021





Technique: Helical CT imaging performed of the abdomen and pelvis without the 

use of intravenous contrast. Sagittal and coronal reformats were obtained. 





One or more of the following individualized dose reduction techniques were 

utilized for this examination:  





1. Automated exposure control


2. Adjustment of the mA and/or kV according to patient size


3. Use of iterative reconstruction technique.





Findings:





Inherently limited evaluation without intravenous contrast.





Lower chest: Lung bases are clear. The heart is normal in size.





Liver: Normal noncontrast appearance of the liver.





Gallbladder/Biliary Tree: Gallbladder surgically absent. Bile ducts are normal.





Pancreas: Normal.





Spleen: Normal.





Adrenal Glands: Normal.





Kidneys/Ureters/Bladder: Kidneys are normal in size. There are punctate right 

renal calculi. No hydronephrosis. Distal left ureteral calculus and left hydro

ureteronephrosis seen on prior exam has resolved. Ureters and bladder are 

normal.





Reproductive Organs: The uterus is anteverted. An IUD is in appropriate 

position. 





Stomach, small bowel, and colon: The stomach, small bowel, colon, and appendix 

are normal.





Vasculature: The aorta is normal in caliber.





Lymph Nodes: No lymphadenopathy.





Peritoneum and retroperitoneum: No free fluid or free air.





Bones: No acute osseous abnormality.





Impression: Right nephrolithiasis with several punctate calculi. No hydrone

phrosis.





Electronically signed by: Winsome Ramsey MD (2021 10:33 PM) [facility]














DICTATED AND SIGNED BY:     WINSOME RAMSEY MD


DATE:     21





CC: MARIBELL MAJOR MD; PCP,NO ~MTH0 0


[facility] [address], [place] [address] [phone]


                                        


                                 IMAGING REPORT





                                     Signed





PATIENT: MARIA A RANGEL    ACCOUNT: [id number]     MRN#: [id number]


: 1969           LOCATION: ER              AGE: 51


SEX: F                    EXAM DT: 21         ACCESSION#: [id number]


STATUS: REG ER            ORD. PHYSICIAN: MELI VANEGAS DO


REASON: dyspnea


PROCEDURE: PORTABLE CHEST 1V





Exam: Chest one view





INDICATION: Dyspnea





TECHNIQUE: Frontal view of the Isai





Comparisons: 3/21/2021





FINDINGS:


The cardiomediastinal silhouette and pulmonary vessels are within normal limits.





The lung and pleural spaces are clear.





IMPRESSION:


No acute cardiopulmonary process.





Electronically signed by: Tao Cole MD (2021 8:09 PM) [facility]














DICTATED AND SIGNED BY:     TAO COLE MD


DATE:     21





CC: MELI VANEGAS DO; MARIBELL MAJOR MD; RASHMI JUÁREZ ~MTH0 0


[][facility] [address], [place] [address]


                                 [phone]


                                        


                                 IMAGING REPORT





                                     Signed





PATIENT: MARIA A RANGEL    ACCOUNT: [id number]     MRN#: [id number]


: 1969           LOCATION: ER              AGE: 51


SEX: F                    EXAM DT: 21         ACCESSION#: [id number]


STATUS: REG ER            ORD. PHYSICIAN: MARIBELL MAJOR MD


REASON: stroke


PROCEDURE: CT HEAD AND CERVICAL SPINE WO





Exam: CT head





INDICATION: Stroke





TECHNIQUE: Sequential axial images through the head and cervical spine were 

obtained without the administration of IV contrast.





Exposure: One or more of the following in the visualized dose reduction 

techniques were utilized for this examination:


1.  Automated exposure control


2.  Adjustment of the MA and/or KV according to patient size


3.  Use of iterative of reconstructive technique








Comparisons: None





FINDINGS:





Head:


No focal parenchymal lesion or hemorrhage is identified. There is no midline 

shift or sulcal effacement.





No acute vascular territory infarction is identified. Gray-white distinction is 

preserved.





The ventricular system is within normal limits without compression 

hydrocephalus. The basal cisterns are well maintained.





The visualized portions of the paranasal sinuses and mastoid air cells are well-

pneumatized. No acute fractures.





Cervical spine:


Straightening of the cervical spine which may positional. Vertebral body heights

 are well-maintained.





Fracture to the cervical spine is not identified.





Mild multilevel spondylotic change in cervical spine with degenerative disc 

disease greatest at C4-C5 and C5-C6.





Visualized paraspinal soft tissues are unremarkable.





IMPRESSION:


1.  No acute intracranial abnormality.


2.  Negative CT C-spine for acute traumatic injury.








**********FOR INTERNAL CODING PURPOSES**********





Critical result:





Findings discussed with  Gianluca at 2021 6:08 PM.





RESULT CODE: (C)  





  











Electronically signed by: Tao Cole MD (2021 6:11 PM) [facility]














DICTATED AND SIGNED BY:     TAO COLE MD


DATE:     21





CC: MARIBELL MAJOR MD; RASHMI JUÁREZ ~MTH0 0





Heart Score:


C/O Chest Pain:  N/A


HEART Score for Chest Pain:  








HEART Score for Chest Pain Response (Comments) Value


 


History Slighlty/Non-Suspicious 0


 


ECG Normal 0


 


Age >45 - < 65 1


 


Risk Factors                            1 or 2 Risk Factors 1


 


Troponin < Normal Limit 0


 


Total  2








Risk Factors:


Risk Factors:  DM, Current or recent (<one month) smoker, HTN, HLP, family 

history of CAD, obesity.


Risk Scores:


Score 0 - 3:  2.5% MACE over next 6 weeks - Discharge Home


Score 4 - 6:  20.3% MACE over next 6 weeks - Admit for Clinical Observation


Score 7 - 10:  72.7% MACE over next 6 weeks - Early Invasive Strategies





Course & Med Decision Making:


Course & Med Decision Making


Pertinent Labs and Imaging studies reviewed. (See chart for details)





Patient push fluids.  Patient returned to her baseline meds.  Patient follow-up 

with cardiology.  Patient keep follow-up appointment with neurology.  Patient is

 referred for extensive time in the ED and pain complaints were treated.  

Patient at this time declined admission.  Requested discharge home.





Knee splint/normalized place.  Patient use crutches or walker.  Patient elevate 

right knee.  Patient follow-up with primary care.  Patient follow-up with 

orthopedics.  Consider repeat x-ray in 2 weeks to see if a callus formation  

present 








Impression:





1. Falls.


2. Rt. Knee Contusion-possible nondisplaced fracture


3. Hypertension


4.  History of migraines


5.  Near syncopal/dizzy episodes





[]





Dragon Disclaimer:


Dragon Disclaimer:


This electronic medical record was generated, in whole or in part, using a voice

 recognition dictation system.





Departure


Departure:


Referrals:  


RASHMI JUÁREZ (PCP)





Helen Disclaimer


This chart was dictated in whole or in part using Voice Recognition software in 

a busy, high-work load, and often noisy Emergency Department environment.  It 

may contain unintended and wholly unrecognized errors or omissions.











MARIBELL MAJOR MD           Sep 26, 2021 18:33

## 2021-09-26 NOTE — RAD
Exam: Chest one view



INDICATION: Dyspnea



TECHNIQUE: Frontal view of the Isai



Comparisons: 3/21/2021



FINDINGS:

The cardiomediastinal silhouette and pulmonary vessels are within normal limits.



The lung and pleural spaces are clear.



IMPRESSION:

No acute cardiopulmonary process.



Electronically signed by: Tao Vázquez MD (9/26/2021 8:09 PM) [facility]

## 2021-09-26 NOTE — RAD
Exam: CT of lumbar spine and pelvis without contrast



INDICATION: Fall



TECHNIQUE: Sequential axial images through the lumbar spine and pelvis obtained without IV contrast. 
Sagittal and coronal reformatted images were reconstructed from the axial data and reviewed.



Exposure: One or more of the following in the visualized dose reduction techniques were utilized for 
this examination:

1.  Automated exposure control

2.  Adjustment of the MA and/or KV according to patient size

3.  Use of iterative of reconstructive technique



Comparisons: CT 1/1/2020



FINDINGS:



Lumbar spine:

Vertebral body heights and alignment are well-maintained.



Fracture to the lumbar spine is not identified.



Mild bilateral facet arthropathy noted in the lower lumbar spine.



Visualized paraspinal soft tissues are unremarkable.



Pelvis:

 Postoperative changes at the rectum noted. Otherwise, Visualized intrapelvic structures are unremark
able.



Bone mineralization is normal. No acute or healed fractures.



Sacroiliac joints, pubic symphysis and hip joints are well-maintained.



Soft tissues of the visualized lower extremities are unremarkable.



IMPRESSION:

1.  Negative CT lumbar spine for acute traumatic injury.

2.  No acute traumatic injury identified at the pelvis.





Electronically signed by: Tao Vázquez MD (9/26/2021 10:03 PM) [facility]

## 2021-09-26 NOTE — RAD
Exam: CT head



INDICATION: Stroke



TECHNIQUE: Sequential axial images through the head and cervical spine were obtained without the admi
nistration of IV contrast.



Exposure: One or more of the following in the visualized dose reduction techniques were utilized for 
this examination:

1.  Automated exposure control

2.  Adjustment of the MA and/or KV according to patient size

3.  Use of iterative of reconstructive technique





Comparisons: None



FINDINGS:



Head:

No focal parenchymal lesion or hemorrhage is identified. There is no midline shift or sulcal effaceme
nt.



No acute vascular territory infarction is identified. Gray-white distinction is preserved.



The ventricular system is within normal limits without compression hydrocephalus. The basal cisterns 
are well maintained.



The visualized portions of the paranasal sinuses and mastoid air cells are well-pneumatized. No acute
 fractures.



Cervical spine:

Straightening of the cervical spine which may positional. Vertebral body heights are well-maintained.




Fracture to the cervical spine is not identified.



Mild multilevel spondylotic change in cervical spine with degenerative disc disease greatest at C4-C5
 and C5-C6.



Visualized paraspinal soft tissues are unremarkable.



IMPRESSION:

1.  No acute intracranial abnormality.

2.  Negative CT C-spine for acute traumatic injury.





**********FOR INTERNAL CODING PURPOSES**********



Critical result:



Findings discussed with  Gianluca at 9/26/2021 6:08 PM.



RESULT CODE: (C)  



  







Electronically signed by: Tao Vázquez MD (9/26/2021 6:11 PM) [facility]

## 2021-09-27 NOTE — EKG
[facility]

               [address] [place] [address]

Test Date:    2021               Test Time:    00:43:27

Pat Name:     MARIA A RANGEL              Department:   

Patient ID:   [id number]           Room:          

Gender:       F                        Technician:   [facility]

:          1969               Requested By: MARIBELL MAJOR

Order Number: [id number]            Reading MD:     

                                 Measurements

Intervals                              Axis          

Rate:         78                       P:            28

KS:           196                      QRS:          -3

QRSD:         82                       T:            52

QT:           398                                    

QTc:          457                                    

                           Interpretive Statements

SINUS RHYTHM

LEFTWARD AXIS

OTHERWISE NORMAL ECG

RI6.02

No previous ECG available for comparison

## 2021-11-12 NOTE — RAD
History:  Routine Screening



PROCEDURE:  3-D tomosynthesis was performed of the breasts bilaterally.  2-D C-view craniocaudal and 
mediolateral oblique digital mammograms were also generated.  The images were also evaluated with com
puter-aided detection and the CAD results were analyzed.  



Previous: Bilateral mammogram from 8/6/2018.



FINDINGS:



Density level B: There are scattered fibroglandular densities. There are no suspicious masses, suspic
ious microcalcifications or areas of architectural distortion.Benign stable nodule slightly intramamm
hector lymph nodes are seen in both breasts



IMPRESSION: Benign mammogram. Patient information was entered into the [employer] reminder system with a
 target due date for the next screening mammogram . Routine annual screening mammogram in one year ad
vised.



BI-RADS Category 2: Benign. 



A mammogram does not have 100% sensitivity and therefore a negative imaging study should not delay fu
rther work up of a suspicious abnormality.



PQRS compliance statement: Patient information was entered into the [employer] reminder system with a ta
rget due date for the next screening mammogram . Routine annual screening mammogram in one year advis
ed.



"Our facility is accredited by the American College [employer] Mammography Program." 



Electronically signed by: Jenna Kraus MD (11/12/2021 4:34 PM) [id number]